# Patient Record
Sex: FEMALE | URBAN - METROPOLITAN AREA
[De-identification: names, ages, dates, MRNs, and addresses within clinical notes are randomized per-mention and may not be internally consistent; named-entity substitution may affect disease eponyms.]

---
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
## 2023-10-25 NOTE — PROCEDURE: BIOPSY BY SHAVE METHOD
----- Message from Sabina Ramsey MD sent at 10/25/2023  8:50 AM CDT -----  Dear Carole, Your test showed that you have bacterial vaginosis, a prescription has been called in.  Please see below for more information about this infection and let me know if you have any questions via portal or you can call [phone] to speak to a nurse. Be well, Dr. Ramsey     What is bacterial vaginosis?    Bacterial vaginosis is an infection in the vagina that can cause bad-smelling vaginal discharge. \"Vaginal discharge\" is the term doctors and nurses use to describe any fluid that comes out of the vagina. Some amount of vaginal discharge is normal. But people with bacterial vaginosis can have a lot of vaginal discharge, or vaginal discharge that smells bad.  Bacterial vaginosis is caused by certain bacteria (germs). The vagina normally has different types of bacteria in it. But when the amounts or the types of bacteria change, an infection can happen.    Bacterial vaginosis usually affects people who are, or have been, sexually active. Your risk of getting it increases if you have a new sex partner or more than one partner. This is true whether your partners are male or female.    If you have bacterial vaginosis, you have a higher chance of catching other infections that are spread through sex. You can lower this risk by using condoms when you have sex.  What are the symptoms of bacterial vaginosis?Some people with bacterial vaginosis have no symptoms. When symptoms do happen, they often include a \"fishy-smelling\" vaginal discharge. The discharge is watery and off-white or gray. The smell might be more noticeable:  ?During your period  ?After sex with a male partner - This happens when semen (the fluid that is released during sex) mixes with your vaginal fluids.  You might also notice a burning feeling in your vagina.    Is there a test for bacterial vaginosis?    Yes. Your doctor or nurse will do an exam. They will also take a 
sample of your vaginal discharge, and do lab tests on it to look for an infection.    How is bacterial vaginosis treated?    Bacterial vaginosis is treated with medicine. The 2 medicines most often used are:  ?Metronidazole  ?Clindamycin  Both of these medicines come in different forms. They can come as a pill that you swallow or as a gel or cream that you put inside your vagina. Most people have fewer side effects when they use the gel or cream treatment. But you and your doctor or nurse will decide which medicine and which form is right for you.  It is important that you take all of the medicine your doctor or nurse prescribes, even if your symptoms go away after a few doses. Taking all of your medicine can help prevent the symptoms from coming back.  Do my sex partners need to be treated if I have bacterial vaginosis?It depends. If your sex partner does not have a vagina, they do not need to be treated if you have bacterial vaginosis. But if your partner does have a vagina, you should tell them about your bacterial vaginosis. That way they can be treated.    What happens if my symptoms come back?    Once you have had bacterial vaginosis, it can come back, even if you are not having sex. If your symptoms come back, let your doctor or nurse know. You might need treatment with more medicine.  Some people get bacterial vaginosis over and over again. If this happens to you, your doctor might suggest taking medicine for 3 to 6 months. This might help to prevent future infections.    What if I am pregnant and have symptoms of bacterial vaginosis?    If you are pregnant and have symptoms of bacterial vaginosis, tell your doctor or nurse. You might need treatment with medicine.    Can bacterial vaginosis be prevented?    Sometimes. You can help prevent bacterial vaginosis by using condoms when you have sex with a male partner.   You can also avoid things that increase the risk of bacterial vaginosis. For example:  ?Avoid 
douching (putting liquid inside your vagina to rinse it out)  ?Do not smoke, or try to quit if you already smoke  ?Avoid sharing sex toys, and clean toys between uses   
Hide Second Anesthesia?: No
Consent: Written consent was obtained and risks were reviewed including but not limited to scarring, infection, bleeding, scabbing, incomplete removal, nerve damage and allergy to anesthesia.
Curettage Text: The wound bed was treated with curettage after the biopsy was performed.
Information: Selecting Yes will display possible errors in your note based on the variables you have selected. This validation is only offered as a suggestion for you. PLEASE NOTE THAT THE VALIDATION TEXT WILL BE REMOVED WHEN YOU FINALIZE YOUR NOTE. IF YOU WANT TO FAX A PRELIMINARY NOTE YOU WILL NEED TO TOGGLE THIS TO 'NO' IF YOU DO NOT WANT IT IN YOUR FAXED NOTE.
Depth Of Biopsy: dermis
Hemostasis: Drysol
Additional Anesthesia Volume In Cc (Will Not Render If 0): 0
Biopsy Method: Dermablade
Notification Instructions: Patient will be notified of biopsy results. However, patient instructed to call the office if not contacted within 2 weeks.
X Size Of Lesion In Cm: 0.6
Post-Care Instructions: I reviewed with the patient in detail post-care instructions. Patient is to keep the biopsy site dry overnight, and then apply bacitracin twice daily until healed. Patient may apply hydrogen peroxide soaks to remove any crusting.
Detail Level: Detailed
Anesthesia Type: 1% lidocaine with epinephrine
Cryotherapy Text: The wound bed was treated with cryotherapy after the biopsy was performed.
Electrodesiccation And Curettage Text: The wound bed was treated with electrodesiccation and curettage after the biopsy was performed.
Biopsy Type: H and E
Dressing: bandage
Electrodesiccation Text: The wound bed was treated with electrodesiccation after the biopsy was performed.
Type Of Destruction Used: Curettage
Anesthesia Volume In Cc (Will Not Render If 0): 0.5
Billing Type: Third-Party Bill
Wound Care: Petrolatum
Silver Nitrate Text: The wound bed was treated with silver nitrate after the biopsy was performed.
Was A Bandage Applied: Yes